# Patient Record
Sex: MALE | Race: BLACK OR AFRICAN AMERICAN | NOT HISPANIC OR LATINO | Employment: STUDENT | URBAN - METROPOLITAN AREA
[De-identification: names, ages, dates, MRNs, and addresses within clinical notes are randomized per-mention and may not be internally consistent; named-entity substitution may affect disease eponyms.]

---

## 2022-03-11 ENCOUNTER — OFFICE VISIT (OUTPATIENT)
Dept: INTERNAL MEDICINE | Facility: CLINIC | Age: 22
End: 2022-03-11
Payer: COMMERCIAL

## 2022-03-11 VITALS
OXYGEN SATURATION: 97 % | BODY MASS INDEX: 40.29 KG/M2 | HEIGHT: 73 IN | DIASTOLIC BLOOD PRESSURE: 58 MMHG | WEIGHT: 304 LBS | SYSTOLIC BLOOD PRESSURE: 117 MMHG | TEMPERATURE: 98 F | HEART RATE: 62 BPM

## 2022-03-11 DIAGNOSIS — F32.A DEPRESSION, UNSPECIFIED DEPRESSION TYPE: ICD-10-CM

## 2022-03-11 DIAGNOSIS — R41.840 DIFFICULTY CONCENTRATING: Primary | ICD-10-CM

## 2022-03-11 PROCEDURE — 99999 PR PBB SHADOW E&M-NEW PATIENT-LVL V: ICD-10-PCS | Mod: PBBFAC,,, | Performed by: FAMILY MEDICINE

## 2022-03-11 PROCEDURE — 99203 OFFICE O/P NEW LOW 30 MIN: CPT | Mod: S$GLB,,, | Performed by: FAMILY MEDICINE

## 2022-03-11 PROCEDURE — 99203 PR OFFICE/OUTPT VISIT, NEW, LEVL III, 30-44 MIN: ICD-10-PCS | Mod: S$GLB,,, | Performed by: FAMILY MEDICINE

## 2022-03-11 PROCEDURE — 99999 PR PBB SHADOW E&M-NEW PATIENT-LVL V: CPT | Mod: PBBFAC,,, | Performed by: FAMILY MEDICINE

## 2022-03-11 RX ORDER — LISDEXAMFETAMINE DIMESYLATE 40 MG/1
40 CAPSULE ORAL EVERY MORNING
Qty: 30 CAPSULE | Refills: 0 | Status: SHIPPED | OUTPATIENT
Start: 2022-03-11 | End: 2022-05-03 | Stop reason: SDUPTHER

## 2022-03-11 RX ORDER — CYCLOBENZAPRINE HCL 10 MG
10 TABLET ORAL
COMMUNITY
End: 2022-05-03

## 2022-03-11 RX ORDER — AMOXICILLIN 500 MG
1 CAPSULE ORAL DAILY
COMMUNITY

## 2022-03-12 NOTE — PROGRESS NOTES
Subjective:      Patient ID: Marilyn Gregg is a 21 y.o. male.    Chief Complaint: ADHD and Manic Behavior (Bipolar Testing)      Patient reports he recently initiated seeing a therapist for issues with anxiety. The therapist thought he needed to be evaluated for ADHD and bipolar disorder. He reports he is a student at [employer], has been struggling to pass, in danger of being suspended. Reports he had difficulty with school in middle and high school but just managed to pass. Has never been evaluated for ADHD. Reports some periods of depression but does not have any symptoms of classic raj.     Review of Systems   Constitutional: Negative for activity change and appetite change.   Respiratory: Negative for shortness of breath.    Cardiovascular: Negative for chest pain and palpitations.   Psychiatric/Behavioral: Positive for decreased concentration and dysphoric mood (intermittent). Negative for agitation, behavioral problems, confusion, sleep disturbance and suicidal ideas. The patient is nervous/anxious (generalized worrying).      Past Medical History:   Diagnosis Date    GERD (gastroesophageal reflux disease)           History reviewed. No pertinent surgical history.  Family History   Problem Relation Age of Onset    No Known Problems Mother     No Known Problems Father      Social History     Socioeconomic History    Marital status: Single   Tobacco Use    Smoking status: Never Smoker    Smokeless tobacco: Never Used   Substance and Sexual Activity    Alcohol use: Yes     Alcohol/week: 9.0 standard drinks     Types: 3 Glasses of wine, 3 Cans of beer, 3 Shots of liquor per week    Drug use: Never    Sexual activity: Yes     Partners: Female     Review of patient's allergies indicates:   Allergen Reactions    Cephalexin Hives and Swelling       Objective:       BP (!) 117/58 (BP Location: Left arm, Patient Position: Sitting, BP Method: Large (Automatic))   Pulse 62   Temp 97.7 °F (36.5 °C) (Tympanic)   Ht  "6' 1" (1.854 m)   Wt (!) 137.9 kg (304 lb 0.2 oz)   SpO2 97%   BMI 40.11 kg/m²   Physical Exam  Constitutional:       General: He is not in acute distress.     Appearance: Normal appearance. He is well-developed. He is not ill-appearing or diaphoretic.   Cardiovascular:      Rate and Rhythm: Normal rate and regular rhythm.      Heart sounds: Normal heart sounds.   Pulmonary:      Effort: Pulmonary effort is normal.      Breath sounds: Normal breath sounds.   Neurological:      General: No focal deficit present.      Mental Status: He is alert and oriented to person, place, and time.   Psychiatric:         Mood and Affect: Mood normal.         Behavior: Behavior normal.         Thought Content: Thought content normal.         Judgment: Judgment normal.       Assessment:     1. Difficulty concentrating    2. Depression, unspecified depression type      Plan:   Difficulty concentrating  -     Ambulatory referral/consult to Psychiatry; Future; Expected date: 03/19/2022    Depression, unspecified depression type  -     Ambulatory referral/consult to Psychiatry; Future; Expected date: 03/19/2022    Other orders  -     lisdexamfetamine (VYVANSE) 40 MG Cap; Take 1 capsule (40 mg total) by mouth every morning.  Dispense: 30 capsule; Refill: 0    Discussed I do think he needs formal evaluation - will send referral to [facility] [place]  Medication List with Changes/Refills   New Medications    LISDEXAMFETAMINE (VYVANSE) 40 MG CAP    Take 1 capsule (40 mg total) by mouth every morning.   Current Medications    CYCLOBENZAPRINE (FLEXERIL) 10 MG TABLET    Take 10 mg by mouth as needed for Muscle spasms.    OMEGA-3 FATTY ACIDS/FISH OIL (FISH OIL-OMEGA-3 FATTY ACIDS) 300-1,000 MG CAPSULE    Take 1 capsule by mouth once daily.       "

## 2022-03-18 ENCOUNTER — TELEPHONE (OUTPATIENT)
Dept: INTERNAL MEDICINE | Facility: CLINIC | Age: 22
End: 2022-03-18
Payer: COMMERCIAL

## 2022-03-18 NOTE — TELEPHONE ENCOUNTER
----- Message from Abel Camarillo sent at 3/18/2022  1:26 PM CDT -----  Contact: PT  PT is calling to see the status of his prescription. He was there last week on the 11th and the pharmacy still doesn't have his order. Call back at [phone]    Pharmacy:  [facility]/pharmacy #[id number] - [address] [facility] [facility] [address] [place] [address]  Phone: [phone] Fax: [phone]

## 2022-03-18 NOTE — TELEPHONE ENCOUNTER
----- Message from Marti Sanders sent at 3/18/2022  3:52 PM CDT -----  Patient Returning Call    Who Called:PT     Who Left Message for Patient:Paula    Does the patient know what this is regarding?: referral    Best Call Back Number:[phone]

## 2022-03-21 ENCOUNTER — TELEPHONE (OUTPATIENT)
Dept: INTERNAL MEDICINE | Facility: CLINIC | Age: 22
End: 2022-03-21
Payer: COMMERCIAL

## 2022-03-21 NOTE — TELEPHONE ENCOUNTER
----- Message from Cheryl Golden sent at 3/21/2022  1:57 PM CDT -----  States he would like to speak to Modesta regarding his prescription vyvanse on Friday. States the pharmacy told him he needed a referral. States Modesta told him he didn't need a referral. States he hasn't heard anything back. Please call pt [phone]. Thank you

## 2022-03-21 NOTE — TELEPHONE ENCOUNTER
Called patient educated did a PA at the end of PA it stated no pa needed medication covered , patient stating it cost of medication . Educated to discuss with his pharmacy

## 2022-03-23 ENCOUNTER — TELEPHONE (OUTPATIENT)
Dept: INTERNAL MEDICINE | Facility: CLINIC | Age: 22
End: 2022-03-23
Payer: COMMERCIAL

## 2022-03-23 NOTE — TELEPHONE ENCOUNTER
----- Message from Kyra Lomeli sent at 3/23/2022 11:59 AM CDT -----  Pt called requesting a call back in regards to a medication , please give a call back at .[phone]   Thanks

## 2022-03-25 ENCOUNTER — TELEPHONE (OUTPATIENT)
Dept: INTERNAL MEDICINE | Facility: CLINIC | Age: 22
End: 2022-03-25
Payer: COMMERCIAL

## 2022-03-25 NOTE — TELEPHONE ENCOUNTER
----- Message from Lois Garg sent at 3/25/2022  1:40 PM CDT -----  Regarding: Meds  Please call patient at [phone].  Needs an authorization for Vivance sent to [facility] at [facility] and [address].

## 2022-03-25 NOTE — TELEPHONE ENCOUNTER
Called mario alberto at [facility] educated ran a pa on medication stated it was not need medication covered , reran awaiting new response . Called patient to educated insurance ran thru but medication I the 300 dollar range  He can go to [employer] web site fill out for coupon to see if he qualify's for coupon . Patient did not answer phone  Left message to call office

## 2022-03-28 ENCOUNTER — TELEPHONE (OUTPATIENT)
Dept: INTERNAL MEDICINE | Facility: CLINIC | Age: 22
End: 2022-03-28
Payer: COMMERCIAL

## 2022-03-28 NOTE — TELEPHONE ENCOUNTER
----- Message from Devante Esparza sent at 3/28/2022  8:43 AM CDT -----  Hello,    The following patient's mother Ricardo, is requesting a call back regarding lisdexamfetamine (VYVANSE) 40 MG Cap. She was last told an authorization was required before script is filled. Please contact her at [phone]. She states her son is in need of his medication.     Thank you

## 2022-05-03 ENCOUNTER — OFFICE VISIT (OUTPATIENT)
Dept: INTERNAL MEDICINE | Facility: CLINIC | Age: 22
End: 2022-05-03
Payer: COMMERCIAL

## 2022-05-03 VITALS
OXYGEN SATURATION: 96 % | SYSTOLIC BLOOD PRESSURE: 114 MMHG | DIASTOLIC BLOOD PRESSURE: 69 MMHG | WEIGHT: 302.25 LBS | TEMPERATURE: 98 F | BODY MASS INDEX: 40.06 KG/M2 | HEIGHT: 73 IN | HEART RATE: 70 BPM

## 2022-05-03 DIAGNOSIS — R41.840 DIFFICULTY CONCENTRATING: Primary | ICD-10-CM

## 2022-05-03 PROCEDURE — 99213 OFFICE O/P EST LOW 20 MIN: CPT | Mod: S$GLB,,, | Performed by: FAMILY MEDICINE

## 2022-05-03 PROCEDURE — 99213 PR OFFICE/OUTPT VISIT, EST, LEVL III, 20-29 MIN: ICD-10-PCS | Mod: S$GLB,,, | Performed by: FAMILY MEDICINE

## 2022-05-03 PROCEDURE — 99999 PR PBB SHADOW E&M-EST. PATIENT-LVL III: CPT | Mod: PBBFAC,,, | Performed by: FAMILY MEDICINE

## 2022-05-03 PROCEDURE — 99999 PR PBB SHADOW E&M-EST. PATIENT-LVL III: ICD-10-PCS | Mod: PBBFAC,,, | Performed by: FAMILY MEDICINE

## 2022-05-03 RX ORDER — LISDEXAMFETAMINE DIMESYLATE 40 MG/1
40 CAPSULE ORAL EVERY MORNING
Qty: 30 CAPSULE | Refills: 0 | Status: SHIPPED | OUTPATIENT
Start: 2022-05-03 | End: 2022-06-02

## 2022-05-03 NOTE — PROGRESS NOTES
"Subjective:      Patient ID: Marilyn Gregg is a 22 y.o. male.    Chief Complaint: Medication Refill      Patient here for follow up. Finds the vyvanse helps significantly with his concentration, no adverse side effects. Did not follow up with Dr. Barrera as he has been busy with practice    Review of Systems   Constitutional: Negative for activity change, appetite change and unexpected weight change.   Cardiovascular: Negative for chest pain and palpitations.   Psychiatric/Behavioral: Positive for decreased concentration. Negative for dysphoric mood and sleep disturbance. The patient is not nervous/anxious.      Past Medical History:   Diagnosis Date    GERD (gastroesophageal reflux disease)           History reviewed. No pertinent surgical history.  Family History   Problem Relation Age of Onset    No Known Problems Mother     No Known Problems Father      Social History     Socioeconomic History    Marital status: Single   Tobacco Use    Smoking status: Never Smoker    Smokeless tobacco: Never Used   Substance and Sexual Activity    Alcohol use: Yes     Alcohol/week: 9.0 standard drinks     Types: 3 Glasses of wine, 3 Cans of beer, 3 Shots of liquor per week    Drug use: Never    Sexual activity: Yes     Partners: Female     Review of patient's allergies indicates:   Allergen Reactions    Cephalexin Hives and Swelling       Objective:       /69 (BP Location: Right arm, Patient Position: Sitting, BP Method: Large (Automatic))   Pulse 70   Temp 98.4 °F (36.9 °C) (Tympanic)   Ht 6' 1" (1.854 m)   Wt (!) 137.1 kg (302 lb 4 oz)   SpO2 96%   BMI 39.88 kg/m²   Physical Exam  Constitutional:       General: He is not in acute distress.     Appearance: Normal appearance. He is well-developed. He is not ill-appearing or diaphoretic.   Cardiovascular:      Rate and Rhythm: Normal rate and regular rhythm.      Heart sounds: Normal heart sounds.   Pulmonary:      Effort: Pulmonary effort is normal.      " Breath sounds: Normal breath sounds.   Neurological:      General: No focal deficit present.      Mental Status: He is alert and oriented to person, place, and time.   Psychiatric:         Mood and Affect: Mood normal.         Behavior: Behavior normal.         Thought Content: Thought content normal.         Judgment: Judgment normal.       Assessment:     1. Difficulty concentrating      Plan:   Difficulty concentrating    Other orders  -     lisdexamfetamine (VYVANSE) 40 MG Cap; Take 1 capsule (40 mg total) by mouth every morning.  Dispense: 30 capsule; Refill: 0    discussed again need for formal evaluation. he will call the [facility] to reschedule.  Medication List with Changes/Refills   Current Medications    OMEGA-3 FATTY ACIDS/FISH OIL (FISH OIL-OMEGA-3 FATTY ACIDS) 300-1,000 MG CAPSULE    Take 1 capsule by mouth once daily.   Changed and/or Refilled Medications    Modified Medication Previous Medication    LISDEXAMFETAMINE (VYVANSE) 40 MG CAP lisdexamfetamine (VYVANSE) 40 MG Cap       Take 1 capsule (40 mg total) by mouth every morning.    Take 1 capsule (40 mg total) by mouth every morning.   Discontinued Medications    CYCLOBENZAPRINE (FLEXERIL) 10 MG TABLET    Take 10 mg by mouth as needed for Muscle spasms.